# Patient Record
Sex: MALE | Race: ASIAN | NOT HISPANIC OR LATINO
[De-identification: names, ages, dates, MRNs, and addresses within clinical notes are randomized per-mention and may not be internally consistent; named-entity substitution may affect disease eponyms.]

---

## 2024-05-13 PROBLEM — Z00.00 ENCOUNTER FOR PREVENTIVE HEALTH EXAMINATION: Status: ACTIVE | Noted: 2024-05-13

## 2024-05-20 ENCOUNTER — NON-APPOINTMENT (OUTPATIENT)
Age: 29
End: 2024-05-20

## 2024-05-20 ENCOUNTER — APPOINTMENT (OUTPATIENT)
Dept: COLORECTAL SURGERY | Facility: CLINIC | Age: 29
End: 2024-05-20
Payer: MEDICAID

## 2024-05-20 VITALS
HEIGHT: 71 IN | BODY MASS INDEX: 25.48 KG/M2 | SYSTOLIC BLOOD PRESSURE: 112 MMHG | HEART RATE: 72 BPM | WEIGHT: 182 LBS | DIASTOLIC BLOOD PRESSURE: 74 MMHG | TEMPERATURE: 98.4 F

## 2024-05-20 DIAGNOSIS — F17.200 NICOTINE DEPENDENCE, UNSPECIFIED, UNCOMPLICATED: ICD-10-CM

## 2024-05-20 DIAGNOSIS — K60.3 ANAL FISTULA: ICD-10-CM

## 2024-05-20 PROCEDURE — 46600 DIAGNOSTIC ANOSCOPY SPX: CPT

## 2024-05-20 PROCEDURE — 99203 OFFICE O/P NEW LOW 30 MIN: CPT | Mod: 25

## 2024-05-20 NOTE — HISTORY OF PRESENT ILLNESS
[FreeTextEntry1] : 28-year-old male with history of anal fistula for 5 years.  He has undergone prior procedures including seton insertion over 5 years ago.  Seton has fallen out several times and been replaced by a secondary surgeon.  He presents today with complaints of persistent anal discharge.  He wishes to have the seton removed.  Denies pain at this time.  Bowel movements frequent diarrhea.  No history of inflammatory bowel disease  No significant other past medical history.

## 2024-05-20 NOTE — ASSESSMENT
[FreeTextEntry1] : Anal fistula.  Recommend to obtaining MRI for further evaluation of his to extent of sphincter involvement.  Given the long history of indwelling seton and internal opening in close proximity to the anal verge I have outlined to the patient the role of possible LIF T procedure.  The risks, benefits and alternatives detailed.  Pending MRI final treatment recommendations will be forthcoming.

## 2024-05-20 NOTE — PHYSICAL EXAM
[Fistula] : a fistula [Normal] : was normal [None] : there was no rectal mass  [de-identified] : Left anterior anal fistula with seton in place.  Notable external opening close to the anal verge. [FreeTextEntry1] : Medical assistant was present for the entire exam.  Anoscopy was performed for evaluation of the patients rectal bleeding  history . The risks, benefits and alternatives were reviewed.  A lighted anoscope was passed into the anal canal and the entire anal mucosal surface was inspected..   The findings revealed moderate internal hemorrhoids.  Seton in place with notable opening below the dentate line-distal and close proximally to the anal verge. No masses or lesions were identified.